# Patient Record
(demographics unavailable — no encounter records)

---

## 2025-05-26 NOTE — HISTORY OF PRESENT ILLNESS
[FreeTextEntry1] : 46-year-old gentleman who presents today with complaint of feeling like he passed something when he recently voided. Patient currently denies any abdominal or flank pain. He does have a past medical history of kidney stones in the past. He denies hematuria. He denies any other irritative obstructive voiding symptoms. He did have a urine culture which was negative however his urinalysis did show significant RBCs. He is here today for evaluation and follow-up. His postvoid residual is 0 ml today.  5/5/23  45 yo gentleman with microhematuria, concern for kidney stones. Denies change in med/surg history since last office visit. CT Urogram showed 3 mm left no renal stone.  Cystoscopy unremarkable.  5/20/25  48-year-old male with history of kidney stones, microhematuria. Last seen 2 years ago. Referred to Dr. Mora, never followed up.   Seen in [facility] ER 2 weeks ago, right distal stone. He recently passed a stone (did not catch). Denies abdominal or flank pain, no urinary symptoms.   Plan: Recommend f/u with Dr. Mora for metabolic evaluation. Explained importance of f/u due to risk of forming more stone sin the future.

## 2025-05-26 NOTE — HISTORY OF PRESENT ILLNESS
[FreeTextEntry1] : 46-year-old gentleman who presents today with complaint of feeling like he passed something when he recently voided. Patient currently denies any abdominal or flank pain. He does have a past medical history of kidney stones in the past. He denies hematuria. He denies any other irritative obstructive voiding symptoms. He did have a urine culture which was negative however his urinalysis did show significant RBCs. He is here today for evaluation and follow-up. His postvoid residual is 0 ml today.  5/5/23  47 yo gentleman with microhematuria, concern for kidney stones. Denies change in med/surg history since last office visit. CT Urogram showed 3 mm left no renal stone.  Cystoscopy unremarkable.  5/20/25  48-year-old male with history of kidney stones, microhematuria. Last seen 2 years ago. Referred to Dr. Mora, never followed up.   Seen in [facility] ER 2 weeks ago, right distal stone. He recently passed a stone (did not catch). Denies abdominal or flank pain, no urinary symptoms.   Plan: Recommend f/u with Dr. Mora for metabolic evaluation. Explained importance of f/u due to risk of forming more stone sin the future.